# Patient Record
Sex: MALE | Race: WHITE | NOT HISPANIC OR LATINO | URBAN - NONMETROPOLITAN AREA
[De-identification: names, ages, dates, MRNs, and addresses within clinical notes are randomized per-mention and may not be internally consistent; named-entity substitution may affect disease eponyms.]

---

## 2024-02-12 ENCOUNTER — LAB (OUTPATIENT)
Dept: URBAN - NONMETROPOLITAN AREA CLINIC 2 | Facility: CLINIC | Age: 59
End: 2024-02-12

## 2024-02-12 ENCOUNTER — OV CON (OUTPATIENT)
Dept: URBAN - NONMETROPOLITAN AREA CLINIC 2 | Facility: CLINIC | Age: 59
End: 2024-02-12
Payer: COMMERCIAL

## 2024-02-12 VITALS
HEIGHT: 69 IN | SYSTOLIC BLOOD PRESSURE: 113 MMHG | BODY MASS INDEX: 31.84 KG/M2 | HEART RATE: 96 BPM | DIASTOLIC BLOOD PRESSURE: 76 MMHG | WEIGHT: 215 LBS | TEMPERATURE: 98 F

## 2024-02-12 DIAGNOSIS — K21.9 GASTROESOPHAGEAL REFLUX DISEASE, UNSPECIFIED WHETHER ESOPHAGITIS PRESENT: ICD-10-CM

## 2024-02-12 DIAGNOSIS — K86.1 CHRONIC PANCREATITIS, UNSPECIFIED PANCREATITIS TYPE: ICD-10-CM

## 2024-02-12 DIAGNOSIS — R10.13 EPIGASTRIC PAIN: ICD-10-CM

## 2024-02-12 DIAGNOSIS — R19.5 LOOSE STOOLS: ICD-10-CM

## 2024-02-12 PROCEDURE — 99204 OFFICE O/P NEW MOD 45 MIN: CPT

## 2024-02-12 PROCEDURE — 99244 OFF/OP CNSLTJ NEW/EST MOD 40: CPT

## 2024-02-12 RX ORDER — PANTOPRAZOLE SODIUM 40 MG/1
TAKE 1 TABLET BY MOUTH EVERY DAY TABLET, DELAYED RELEASE ORAL
Qty: 90 | Refills: 3 | Status: ACTIVE | COMMUNITY
Start: 2016-11-28 | End: 1900-01-01

## 2024-02-12 RX ORDER — AMITRIPTYLINE HYDROCHLORIDE 25 MG/1
TAKE 1 TABLET BY MOUTH NIGHTLY TABLET, FILM COATED ORAL
Qty: 90 | Refills: 0 | Status: ACTIVE | COMMUNITY
Start: 2016-11-28 | End: 1900-01-01

## 2024-02-12 RX ORDER — PANTOPRAZOLE SODIUM 40 MG/1
1 TABLET TABLET, DELAYED RELEASE ORAL TWICE A DAY
Qty: 180 TABLET | Refills: 3 | OUTPATIENT
Start: 2024-02-12

## 2024-02-12 RX ORDER — SODIUM PICOSULFATE, MAGNESIUM OXIDE, AND ANHYDROUS CITRIC ACID 12; 3.5; 1 G/175ML; G/175ML; MG/175ML
175 ML THE FIRST DOSE THE EVENING BEFORE AND SECOND DOSE THE MORNING OF COLONOSCOPY LIQUID ORAL ONCE A DAY
Qty: 350 | OUTPATIENT
Start: 2024-02-12 | End: 2024-02-14

## 2024-02-12 RX ORDER — ESCITALOPRAM OXALATE 5 MG
TABLET ORAL
Qty: 0 | Refills: 0 | Status: ACTIVE | COMMUNITY
Start: 1900-01-01 | End: 1900-01-01

## 2024-02-12 RX ORDER — LISINOPRIL 5 MG/1
TABLET ORAL
Qty: 0 | Refills: 0 | Status: ACTIVE | COMMUNITY
Start: 1900-01-01 | End: 1900-01-01

## 2024-02-12 RX ORDER — ZOLPIDEM TARTRATE 5 MG/1
TAKE 1 TABLET (5 MG) BY ORAL ROUTE ONCE DAILY AT BEDTIME FOR 30 DAYS TABLET, FILM COATED ORAL 1
Qty: 30 | Refills: 0 | Status: ACTIVE | COMMUNITY
Start: 1900-01-01 | End: 1900-01-01

## 2024-02-12 RX ORDER — AMITRIPTYLINE HYDROCHLORIDE 50 MG/1
TAKE 1 TABLET (50 MG TOTAL) BY MOUTH NIGHTLY FOR 30 DAYS TABLET, FILM COATED ORAL
Qty: 30 | Refills: 0 | Status: ACTIVE | COMMUNITY
Start: 2016-08-05 | End: 1900-01-01

## 2024-02-12 RX ORDER — AMLODIPINE BESYLATE 5 MG/1
TAKE 1 TABLET BY MOUTH EVERY DAY TABLET ORAL
Qty: 90 | Refills: 0 | Status: ACTIVE | COMMUNITY
Start: 2016-11-28 | End: 1900-01-01

## 2024-02-12 NOTE — HPI-TODAY'S VISIT:
2/12/2024  Mr. Andrews was referred for evaluation of epigastric pain by Dr. Barber Mg. A copy of this note and recommendations will be forwarded to his office.  He endorses a history of intermittent epigastric to left upper quadrant pain.  He does have a history of chronic pancreatitis with pain treatment including celiac block at [facility] as well two additional at the [facility]. Today he states he is having epigastric stomach pains that feels like bad cramps that does radiate to his back not flared with spicy food this occurs on and off once a week but intensifies once a month.  He has thought about going to the emergency department at times.  This does not occur when drinking alcohol. He is due to repeat EGD and colonoscopy, previously at an outside location with a history of colon polyps however due to repeated subsequent cardiac interventions with Dr. Bhargav Guzmán he was not able to stay on schedule. He does continue on Plavix with most recent PCI in July 2023. He is on pantoprazole 40 mg daily for GERD which covers his reflux. He is no longer using tobacco and avoids NSAIDs. He does endorse weight gain and has plans to attempt to lose over 15 pounds.  He questions whether this has worsened any of his symptoms. His previous MRCP he believes iwas n 2016. He sees Dr. Mg at the [facility] with routine labs and we will request records.  He does occasionally have episodes of  very loose stool not triggered by higher fat meals or eating out, he endorses urgency. He denies blood in his stool or history of anemia. SP

## 2024-02-13 PROBLEM — 235595009: Status: ACTIVE | Noted: 2024-02-12

## 2024-02-13 PROBLEM — 79922009: Status: ACTIVE | Noted: 2024-02-12

## 2024-02-13 PROBLEM — 305058001: Status: ACTIVE | Noted: 2024-02-12

## 2024-02-13 PROBLEM — 53741008: Status: ACTIVE | Noted: 2024-02-12

## 2024-02-13 PROBLEM — 398032003: Status: ACTIVE | Noted: 2024-02-12

## 2024-02-13 PROBLEM — 235494005: Status: ACTIVE | Noted: 2024-02-12

## 2024-02-13 LAB
(TTG) AB, IGA: <1
(TTG) AB, IGG: <1
A/G RATIO: 1.9
ABSOLUTE BASOPHILS: 21
ABSOLUTE EOSINOPHILS: 152
ABSOLUTE LYMPHOCYTES: 1378
ABSOLUTE MONOCYTES: 558
ABSOLUTE NEUTROPHILS: 1993
ALBUMIN: 4.5
ALKALINE PHOSPHATASE: 68
ALT (SGPT): 32
ANTIGLIADIN ABS, IGA: <1
AST (SGOT): 49
BASOPHILS: 0.5
BILIRUBIN, TOTAL: 0.6
BUN/CREATININE RATIO: 12
BUN: 17
CALCIUM: 9.9
CARBON DIOXIDE, TOTAL: 25
CHLORIDE: 104
CREATININE: 1.42
EGFR: 58
EOSINOPHILS: 3.7
GLIADIN (DEAMIDATED) AB (IGG): <1
GLOBULIN, TOTAL: 2.4
GLUCOSE: 90
HEMATOCRIT: 46.8
HEMOGLOBIN: 16
IMMUNOGLOBULIN A: 165
IMMUNOGLOBULIN A: 165
INTERPRETATION: (no result)
LIPASE: 22
LYMPHOCYTES: 33.6
MCH: 32.3
MCHC: 34.2
MCV: 94.5
MONOCYTES: 13.6
MPV: 10.2
NEUTROPHILS: 48.6
PLATELET COUNT: 250
POTASSIUM: 4.2
PROTEIN, TOTAL: 6.9
RDW: 12.9
RED BLOOD CELL COUNT: 4.95
SODIUM: 141
TISSUE TRANSGLUTAMINASE AB, IGA: <1
WHITE BLOOD CELL COUNT: 4.1

## 2024-02-16 LAB — OCCULT BLOOD, FECAL, IA: (no result)

## 2024-02-17 LAB — FECAL FAT, QUALITATIVE: (no result)

## 2024-02-22 LAB — PANCREATIC ELASTASE, FECAL: >500

## 2024-04-25 ENCOUNTER — OV CON (OUTPATIENT)
Dept: URBAN - METROPOLITAN AREA CLINIC 48 | Facility: CLINIC | Age: 59
End: 2024-04-25

## 2024-05-15 ENCOUNTER — TELEPHONE ENCOUNTER (OUTPATIENT)
Dept: URBAN - NONMETROPOLITAN AREA CLINIC 2 | Facility: CLINIC | Age: 59
End: 2024-05-15

## 2024-05-23 ENCOUNTER — OFFICE VISIT (OUTPATIENT)
Dept: URBAN - METROPOLITAN AREA MEDICAL CENTER 1 | Facility: MEDICAL CENTER | Age: 59
End: 2024-05-23

## 2024-07-15 ENCOUNTER — OFFICE VISIT (OUTPATIENT)
Dept: URBAN - NONMETROPOLITAN AREA CLINIC 2 | Facility: CLINIC | Age: 59
End: 2024-07-15

## 2024-07-26 ENCOUNTER — P2P PATIENT RECORD (OUTPATIENT)
Age: 59
End: 2024-07-26

## 2025-01-16 ENCOUNTER — OFFICE VISIT (OUTPATIENT)
Dept: URBAN - METROPOLITAN AREA MEDICAL CENTER 1 | Facility: MEDICAL CENTER | Age: 60
End: 2025-01-16
Payer: COMMERCIAL

## 2025-01-16 ENCOUNTER — LAB OUTSIDE AN ENCOUNTER (OUTPATIENT)
Dept: URBAN - NONMETROPOLITAN AREA CLINIC 2 | Facility: CLINIC | Age: 60
End: 2025-01-16

## 2025-01-16 DIAGNOSIS — D12.2 ADENOMA OF ASCENDING COLON: ICD-10-CM

## 2025-01-16 DIAGNOSIS — Z86.0100 HISTORY OF COLON POLYPS: ICD-10-CM

## 2025-01-16 DIAGNOSIS — R12 BURNING REFLUX: ICD-10-CM

## 2025-01-16 DIAGNOSIS — K22.89 OTHER SPECIFIED DISEASE OF ESOPHAGUS: ICD-10-CM

## 2025-01-16 DIAGNOSIS — K29.60 ADENOPAPILLOMATOSIS GASTRICA: ICD-10-CM

## 2025-01-16 PROCEDURE — 43239 EGD BIOPSY SINGLE/MULTIPLE: CPT | Performed by: INTERNAL MEDICINE

## 2025-01-16 PROCEDURE — 0529F INTRVL 3/>YR PTS CLNSCP DOCD: CPT | Performed by: INTERNAL MEDICINE

## 2025-01-16 PROCEDURE — 45385 COLONOSCOPY W/LESION REMOVAL: CPT | Performed by: INTERNAL MEDICINE

## 2025-01-16 RX ORDER — PANTOPRAZOLE SODIUM 40 MG/1
1 TABLET TABLET, DELAYED RELEASE ORAL TWICE A DAY
Qty: 180 TABLET | Refills: 3 | Status: ACTIVE | COMMUNITY
Start: 2024-02-12

## 2025-01-16 RX ORDER — ESCITALOPRAM OXALATE 5 MG
TABLET ORAL
Qty: 0 | Refills: 0 | Status: ACTIVE | COMMUNITY
Start: 1900-01-01 | End: 1900-01-01

## 2025-01-16 RX ORDER — AMITRIPTYLINE HYDROCHLORIDE 25 MG/1
TAKE 1 TABLET BY MOUTH NIGHTLY TABLET, FILM COATED ORAL
Qty: 90 | Refills: 0 | Status: ACTIVE | COMMUNITY
Start: 2016-11-28 | End: 1900-01-01

## 2025-01-16 RX ORDER — AMLODIPINE BESYLATE 5 MG/1
TAKE 1 TABLET BY MOUTH EVERY DAY TABLET ORAL
Qty: 90 | Refills: 0 | Status: ACTIVE | COMMUNITY
Start: 2016-11-28 | End: 1900-01-01

## 2025-01-16 RX ORDER — ZOLPIDEM TARTRATE 5 MG/1
TAKE 1 TABLET (5 MG) BY ORAL ROUTE ONCE DAILY AT BEDTIME FOR 30 DAYS TABLET, FILM COATED ORAL 1
Qty: 30 | Refills: 0 | Status: ACTIVE | COMMUNITY
Start: 1900-01-01 | End: 1900-01-01

## 2025-01-16 RX ORDER — PANTOPRAZOLE SODIUM 40 MG/1
TAKE 1 TABLET BY MOUTH EVERY DAY TABLET, DELAYED RELEASE ORAL
Qty: 90 | Refills: 3 | Status: ACTIVE | COMMUNITY
Start: 2016-11-28 | End: 1900-01-01

## 2025-01-16 RX ORDER — LISINOPRIL 5 MG/1
TABLET ORAL
Qty: 0 | Refills: 0 | Status: ACTIVE | COMMUNITY
Start: 1900-01-01 | End: 1900-01-01

## 2025-01-16 RX ORDER — AMITRIPTYLINE HYDROCHLORIDE 50 MG/1
TAKE 1 TABLET (50 MG TOTAL) BY MOUTH NIGHTLY FOR 30 DAYS TABLET, FILM COATED ORAL
Qty: 30 | Refills: 0 | Status: ACTIVE | COMMUNITY
Start: 2016-08-05 | End: 1900-01-01

## 2025-01-17 LAB
AP CASE REPORT: (no result)
AP FINAL DIAGNOSIS: (no result)
AP GROSS DESCRIPTION: (no result)
AP MICROSCOPIC DESCRIPTION: (no result)
AP SPECIAL STAINS: (no result)

## 2025-01-21 ENCOUNTER — TELEPHONE ENCOUNTER (OUTPATIENT)
Dept: URBAN - NONMETROPOLITAN AREA CLINIC 2 | Facility: CLINIC | Age: 60
End: 2025-01-21

## 2025-04-21 ENCOUNTER — ERX REFILL RESPONSE (OUTPATIENT)
Dept: URBAN - NONMETROPOLITAN AREA CLINIC 2 | Facility: CLINIC | Age: 60
End: 2025-04-21

## 2025-04-21 RX ORDER — PANTOPRAZOLE SODIUM 40 MG/1
TAKE ONE TABLET BY MOUTH TWICE A DAY TABLET, DELAYED RELEASE ORAL
Qty: 180 TABLET | Refills: 3 | OUTPATIENT

## 2025-04-21 RX ORDER — PANTOPRAZOLE SODIUM 40 MG/1
1 TABLET TABLET, DELAYED RELEASE ORAL TWICE A DAY
Qty: 180 TABLET | Refills: 3 | OUTPATIENT